# Patient Record
Sex: MALE | Race: WHITE | NOT HISPANIC OR LATINO | Employment: OTHER | URBAN - METROPOLITAN AREA
[De-identification: names, ages, dates, MRNs, and addresses within clinical notes are randomized per-mention and may not be internally consistent; named-entity substitution may affect disease eponyms.]

---

## 2017-06-29 DIAGNOSIS — I25.10 CORONARY ATHEROSCLEROSIS OF UNSPECIFIED TYPE OF VESSEL, NATIVE OR GRAFT: Primary | ICD-10-CM

## 2017-06-29 DIAGNOSIS — I10 HTN (HYPERTENSION): ICD-10-CM

## 2018-10-26 ENCOUNTER — TELEPHONE (OUTPATIENT)
Dept: ENDOCRINOLOGY | Facility: CLINIC | Age: 73
End: 2018-10-26

## 2018-10-26 NOTE — TELEPHONE ENCOUNTER
----- Message from Foster Louise sent at 10/26/2018  2:56 PM CDT -----  Type: Needs Medical Advice    Who Called:  Wife  Best Call Back Number: [phone]  Additional Information: Wife would like return call

## 2020-01-28 PROBLEM — Z95.1 HX OF CABG: Status: ACTIVE | Noted: 2020-01-28

## 2022-06-06 ENCOUNTER — OUTPATIENT CASE MANAGEMENT (OUTPATIENT)
Dept: ADMINISTRATIVE | Facility: OTHER | Age: 77
End: 2022-06-06
Payer: MEDICARE

## 2022-06-06 NOTE — PROGRESS NOTES
Outpatient Care Management   - Low Risk Patient Assessment    Patient: Nelson Turner  MRN:  [id number]  Date of Service:  6/6/2022  Completed by:  Mar Piña LMSW  Referral Date: 05/27/2022  Program: OPCM Low Risk    Reason for Visit   Patient presents with    Social Work Assessment - Low/Mod Risk    Case Closure       Brief Summary:SW completed assessment with patient. Patient resides alone but has a friend that resides with him on and off. Patient reports he can complete ADL's without assistance. Patient reports sister-in-law takes care of his IADL's as he is low literacy.  Patient denied using any assisted devices to ambulate. Patient denied needing assistance with food, shelter, medication or medical. Patient reports diabetes is controlled. 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 Patient denied any current symptoms. Patient reports sister-in-law, and brother is health care proxy. Patient denied any needs SW will close case as no needs.       Patient Summary     OPCM Social Work Assessment (Low/Moderate Risk)    General  Level of Caregiver support: Member independent and does not need caregiver assistance  Have you had to make a decision between paying for any of the following in the last 2 months?: None  Transportation means: Self  Employment status: Full time  Current symptoms: None  Assessments  Was the PHQ Depression Screening completed this visit?: No         Complex Care Plan     Care plan was discussed and completed today with input from patient and/or caregiver.    Patient Instructions     No follow-ups on file.    [facility] Self-Management Care Plan was developed with the patients/caregivers input and was based on identified barriers from todays assessment.  Goals were written today with the patient/caregiver and the patient has agreed to work towards these goals to improve his/her overall well-being. Patient verbalized understanding of the  care plan, goals, and all of today's instructions. Encouraged patient/caregiver to communicate with his/her physician and health care team about health conditions and the treatment plan.  Provided my contact information today and encouraged patient/caregiver to call me with any questions as needed.

## 2023-08-06 PROBLEM — R55 SYNCOPE AND COLLAPSE: Status: ACTIVE | Noted: 2023-08-06

## 2023-08-06 PROBLEM — S06.5XAA SUBDURAL HEMATOMA, ACUTE: Status: ACTIVE | Noted: 2023-08-06

## 2023-08-09 DIAGNOSIS — S06.5XAA SUBDURAL HEMATOMA, ACUTE: Primary | ICD-10-CM
